# Patient Record
Sex: MALE | Race: BLACK OR AFRICAN AMERICAN | NOT HISPANIC OR LATINO | Employment: FULL TIME | URBAN - METROPOLITAN AREA
[De-identification: names, ages, dates, MRNs, and addresses within clinical notes are randomized per-mention and may not be internally consistent; named-entity substitution may affect disease eponyms.]

---

## 2018-12-27 ENCOUNTER — HOSPITAL ENCOUNTER (EMERGENCY)
Facility: OTHER | Age: 43
Discharge: HOME | End: 2018-12-27
Attending: EMERGENCY MEDICINE
Payer: MEDICAID

## 2018-12-27 VITALS
HEIGHT: 68 IN | DIASTOLIC BLOOD PRESSURE: 68 MMHG | SYSTOLIC BLOOD PRESSURE: 115 MMHG | RESPIRATION RATE: 18 BRPM | TEMPERATURE: 99 F | WEIGHT: 155 LBS | HEART RATE: 71 BPM | OXYGEN SATURATION: 97 % | BODY MASS INDEX: 23.49 KG/M2

## 2018-12-27 DIAGNOSIS — R05.9 COUGH: ICD-10-CM

## 2018-12-27 DIAGNOSIS — R68.89 FLU-LIKE SYMPTOMS: Primary | ICD-10-CM

## 2018-12-27 LAB
INFLUENZA A, MOLECULAR: NEGATIVE
INFLUENZA B, MOLECULAR: NEGATIVE
SPECIMEN SOURCE: NORMAL

## 2018-12-27 PROCEDURE — 99284 EMERGENCY DEPT VISIT MOD MDM: CPT | Mod: 25

## 2018-12-27 PROCEDURE — 96372 THER/PROPH/DIAG INJ SC/IM: CPT

## 2018-12-27 PROCEDURE — 63600175 PHARM REV CODE 636 W HCPCS: Performed by: PHYSICIAN ASSISTANT

## 2018-12-27 PROCEDURE — 87502 INFLUENZA DNA AMP PROBE: CPT

## 2018-12-27 RX ORDER — GUAIFENESIN/DEXTROMETHORPHAN 100-10MG/5
5 SYRUP ORAL 4 TIMES DAILY PRN
Qty: 120 ML | Refills: 0 | Status: SHIPPED | OUTPATIENT
Start: 2018-12-27 | End: 2019-01-06

## 2018-12-27 RX ORDER — FLUTICASONE PROPIONATE 50 MCG
1 SPRAY, SUSPENSION (ML) NASAL 2 TIMES DAILY
Qty: 15 G | Refills: 0 | Status: SHIPPED | OUTPATIENT
Start: 2018-12-27

## 2018-12-27 RX ORDER — DEXAMETHASONE SODIUM PHOSPHATE 4 MG/ML
8 INJECTION, SOLUTION INTRA-ARTICULAR; INTRALESIONAL; INTRAMUSCULAR; INTRAVENOUS; SOFT TISSUE
Status: COMPLETED | OUTPATIENT
Start: 2018-12-27 | End: 2018-12-27

## 2018-12-27 RX ADMIN — DEXAMETHASONE SODIUM PHOSPHATE 8 MG: 4 INJECTION, SOLUTION INTRAMUSCULAR; INTRAVENOUS at 05:12

## 2018-12-27 NOTE — ED PROVIDER NOTES
Encounter Date: 12/27/2018       History     Chief Complaint   Patient presents with    flu-like symptoms     x 3 days with body aches, productive cough, and headache unrelieved from OTC meds     Patient is a 43-year-old male with no pertinent past medical history presents to the ED with flu-like symptoms. Patient reports 3 day history of general malaise, body aches, congestion, sore throat nonproductive cough and subjective fever.  He reports taking over-the-counter medicines without relief.  He states he took Tylenol a few hours ago.  Patient states I feel like it is all in my chest .  He denies chest pain or shortness of breath. He also reports decreased appetite.  He denies nausea, emesis, diarrhea.      The history is provided by the patient.     Review of patient's allergies indicates:  No Known Allergies  No past medical history on file.  No past surgical history on file.  No family history on file.  Social History     Tobacco Use    Smoking status: Current Every Day Smoker     Packs/day: 1.00     Years: 15.00     Pack years: 15.00     Types: Cigarettes   Substance Use Topics    Alcohol use: Yes     Alcohol/week: 1.2 oz     Types: 2 Cans of beer per week    Drug use: Yes     Types: Marijuana     Review of Systems   Constitutional: Positive for appetite change and fatigue. Negative for chills and fever.   HENT: Positive for congestion, sinus pain and sore throat.    Eyes: Negative for pain.   Respiratory: Positive for cough. Negative for shortness of breath.    Cardiovascular: Negative for chest pain.   Gastrointestinal: Negative for abdominal pain, diarrhea, nausea and vomiting.   Genitourinary: Negative for dysuria.   Musculoskeletal: Positive for myalgias (Generalized body aches). Negative for arthralgias.   Skin: Negative for rash.   Neurological: Negative for numbness.       Physical Exam     Initial Vitals [12/27/18 1534]   BP Pulse Resp Temp SpO2   118/60 82 16 98.4 °F (36.9 °C) 96 %      MAP        --         Physical Exam    Constitutional: Vital signs are normal. He is cooperative.   HENT:   Head: Normocephalic and atraumatic.   Eyes: EOM are normal. Pupils are equal, round, and reactive to light.   Neck: Normal range of motion. Neck supple.   Cardiovascular: Normal rate, regular rhythm and intact distal pulses.   Abdominal: Soft. Bowel sounds are normal. There is no tenderness.   Neurological: He is alert. No cranial nerve deficit. GCS eye subscore is 4. GCS verbal subscore is 5. GCS motor subscore is 6.   Skin: Skin is warm and dry. No rash noted.         ED Course   Procedures  Labs Reviewed   INFLUENZA A & B BY MOLECULAR          Imaging Results          X-Ray Chest PA And Lateral (Final result)  Result time 12/27/18 17:32:45    Final result by Fady Hugo MD (12/27/18 17:32:45)                 Impression:      No acute cardiopulmonary disease      Electronically signed by: Fady Hugo MD  Date:    12/27/2018  Time:    17:32             Narrative:    EXAMINATION:  XR CHEST PA AND LATERAL    CLINICAL HISTORY:  Cough    TECHNIQUE:  PA and lateral views of the chest were performed.    COMPARISON:  04/22/2016    FINDINGS:  Heart size and mediastinal contour are normal.  Lungs are expanded and clear.  No lung consolidation or pleural fluid is identified.  The skeletal structures are intact.                                 Medical Decision Making:   Initial Assessment:   Urgent evaluation of a 43 y.o. male presenting to the emergency department complaining of flu like symptoms. Patient is afebrile, nontoxic appearing and hemodynamically stable.  Patient likely has a viral infection.  I do not believe the  patient to have pneumonia, serious bacterial infection, sepsis, severe dehydration, or hypoxia to warrant blood work at this time.  Will obtain influenza swab and chest x-ray.  ED Management:  Rapid flu swab is negative. Chest x-ray reveals no acute cardiopulmonary process.  I do not believe  further workup is indicated this time.  Patient given Decadron injection here in the ED.  Will send home with cough medicine and Flonase.  He is advised follow up with primary care return here for new worsening symptoms.  He has no other complaints this time is stable for discharge.                      Clinical Impression:     1. Flu-like symptoms    2. Cough                               Michelet Modi PA-C  12/27/18 1817

## 2018-12-27 NOTE — ED TRIAGE NOTES
"Pt arrived to ED with c/o generalized body aches, cough, congestion and sore throat x3 days unrelieved by OTC medication. Pt states " I woke up this morning and I am just feeling worse, I feel like it is all in my chest" pt denies fever, chills, n/v, abdominal pain, dysuria.   "

## 2022-03-04 ENCOUNTER — HOSPITAL ENCOUNTER (EMERGENCY)
Facility: OTHER | Age: 47
Discharge: HOME | End: 2022-03-04
Attending: EMERGENCY MEDICINE
Payer: MEDICAID

## 2022-03-04 VITALS
DIASTOLIC BLOOD PRESSURE: 79 MMHG | BODY MASS INDEX: 30.31 KG/M2 | OXYGEN SATURATION: 100 % | HEART RATE: 64 BPM | WEIGHT: 200 LBS | HEIGHT: 68 IN | TEMPERATURE: 98 F | SYSTOLIC BLOOD PRESSURE: 128 MMHG | RESPIRATION RATE: 18 BRPM
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
## 2022-03-04 DIAGNOSIS — M79.89 LEG SWELLING: ICD-10-CM

## 2022-03-04 DIAGNOSIS — S93.602A SPRAIN OF LEFT FOOT, INITIAL ENCOUNTER: Primary | ICD-10-CM

## 2022-03-04 PROCEDURE — 99284 EMERGENCY DEPT VISIT MOD MDM: CPT | Mod: 25

## 2022-03-04 RX ORDER — NAPROXEN 500 MG/1
500 TABLET ORAL EVERY 12 HOURS PRN
Qty: 10 TABLET | Refills: 0 | Status: SHIPPED | OUTPATIENT
Start: 2022-03-04

## 2022-03-04 RX ORDER — KETOROLAC TROMETHAMINE 30 MG/ML
30 INJECTION, SOLUTION INTRAMUSCULAR; INTRAVENOUS
Status: DISCONTINUED | OUTPATIENT
Start: 2022-03-04 | End: 2022-03-04 | Stop reason: HOSPADM

## 2022-03-04 NOTE — ED PROVIDER NOTES
"Encounter Date: 3/4/2022       History     Chief Complaint   Patient presents with    Foot Injury     Pt c.o left foot pain onset Tuesday. Pt not sure what happen to foot.  Pt states "I must have slipped off the curb or something"  pt has mild swelling to top of left foot. No deformity noted.  + left pedal pulse noted.  AAO x 3nadn     Chief complaint:  Left foot pain    46-year-old male presents with left foot pain since Tuesday.  Patient reports that he stepped onto a curb awkwardly to avoid a crowd.  Patient reports pain is been present since then.  Patient reports the pain has caused him to walk awkwardly.  Reports swelling.  No erythema.  Skin remains intact without rash or signs of infection.  No fever.  No calf pain.  No pain to the rest of the extremity.  No pain medication taken prior to arrival.  Current pain is rated 6/10.    This is the extent of patient's complaints for this ER encounter.     The history is provided by the patient.     Review of patient's allergies indicates:  No Known Allergies  No past medical history on file.  No past surgical history on file.  No family history on file.  Social History     Tobacco Use    Smoking status: Current Every Day Smoker     Packs/day: 1.00     Years: 15.00     Pack years: 15.00     Types: Cigarettes   Substance Use Topics    Alcohol use: Yes     Alcohol/week: 2.0 standard drinks     Types: 2 Cans of beer per week    Drug use: Yes     Types: Marijuana     Review of Systems   Constitutional: Negative for fever.   HENT: Negative for congestion, rhinorrhea and sore throat.    Respiratory: Negative for cough and shortness of breath.    Cardiovascular: Negative for chest pain.   Gastrointestinal: Negative for abdominal pain.   Genitourinary: Negative for difficulty urinating.   Musculoskeletal: Positive for arthralgias (Foot pain with swelling). Negative for back pain, myalgias and neck pain.   Skin: Negative for rash and wound.   Neurological: Negative for " weakness and headaches.   Psychiatric/Behavioral: Negative.        Physical Exam     Initial Vitals [03/04/22 1540]   BP Pulse Resp Temp SpO2   122/76 75 18 98.1 °F (36.7 °C) 97 %      MAP       --         Physical Exam    Nursing note and vitals reviewed.  Constitutional: No distress.   HENT:   Head: Normocephalic and atraumatic.   Nose: Nose normal.   Mouth/Throat: Oropharynx is clear and moist and mucous membranes are normal.   Eyes: Conjunctivae, EOM and lids are normal. Right pupil is round. Left pupil is round. Pupils are equal.   Neck: Neck supple.   Cardiovascular: Normal rate, regular rhythm and normal heart sounds.   Pulmonary/Chest: Effort normal and breath sounds normal. No respiratory distress.   Musculoskeletal:         General: Normal range of motion.      Cervical back: Neck supple.      Left foot: Normal range of motion and normal capillary refill. Swelling and tenderness present. No deformity or laceration. Normal pulse.      Comments: Left foot with swelling and tenderness.  No deformity.  Skin remains intact without signs of infection or rash.  No erythema.  No pain or tenderness to the ankle, tib-fib, knee, or femur.  Generalized swelling is noted to the left lower leg.  1+ edema.     Neurological: He is alert and oriented to person, place, and time. He has normal strength.   Skin: Skin is warm and dry. No rash noted.   Psychiatric: He has a normal mood and affect. His behavior is normal.         ED Course   Procedures  Labs Reviewed - No data to display       Imaging Results          US Lower Extremity Veins Left (Final result)  Result time 03/04/22 18:30:53    Final result by Kadeem Wang MD (03/04/22 18:30:53)                 Impression:      No evidence of deep venous thrombosis in the left lower extremity.      Electronically signed by: Kadeem Wang MD  Date:    03/04/2022  Time:    18:30             Narrative:    EXAMINATION:  US LOWER EXTREMITY VEINS LEFT    CLINICAL HISTORY:  Other  specified soft tissue disorders    TECHNIQUE:  Duplex and color flow Doppler evaluation and graded compression of the left lower extremity veins was performed.    COMPARISON:  None    FINDINGS:  Left thigh veins: The common femoral, femoral, popliteal, upper greater saphenous, and deep femoral veins are patent and free of thrombus. The veins are normally compressible and have normal phasic flow and augmentation response.    Left calf veins: The visualized calf veins are patent.    Contralateral CFV: The contralateral (right) common femoral vein is patent and free of thrombus.    Miscellaneous: None                               X-Ray Foot Complete Left (Final result)  Result time 03/04/22 16:35:37    Final result by Jose Tineo MD (03/04/22 16:35:37)                 Impression:      No acute fracture identified.      Electronically signed by: oJse Tineo MD  Date:    03/04/2022  Time:    16:35             Narrative:    EXAMINATION:  XR FOOT COMPLETE 3 VIEW LEFT    CLINICAL HISTORY:  .  Fall (on)(from) sidewalk curb, initial encounter    TECHNIQUE:  AP, lateral and oblique views of the left foot were performed.    COMPARISON:  None    FINDINGS:  No evidence of an acute fracture or dislocation.  Joint spaces of the foot appear relatively well maintained.  No radiopaque foreign bodies.                                 Medications   ketorolac injection 30 mg (has no administration in time range)     Medical Decision Making:   Initial Assessment:   46-year-old male presents for evaluation of left foot pain.  His left leg and left foot is noted to be swollen.  X-rays are without acute finding.  Due to swelling, ultrasound is ordered.  Tenderness is only present to the left lateral foot.  No erythema or signs of infection.  Skin remains intact.  No fever.  Differential Diagnosis:   Fracture, strain, sprain, dislocation, contusion, DVT  Clinical Tests:   Radiological Study: Ordered  ED Management:  X-ray without  acute significant findings.  Do swelling noted in the leg, DVT ultrasound ordered.  Swelling could be due to abnormal ambulation secondary to pain.  Toradol IM administered.    Refer to patient course below for additional management.              ED Course as of 03/04/22 1844   Fri Mar 04, 2022   1838 No DVT of the left lower extremity noted on ultrasound. [EW]   1843 Patient refusing crutches.  Ace wrap applied.  Educated on rice.  Home with naproxen.    Patient/caregiver voices understanding and feels comfortable with discharge plan.    The patient/caregiver acknowledges that close follow up with medical provider is required. Instructed to follow up with PCP within 2 days. Patient/caregiver was given specific return precautions. The patient/caregiver agrees to comply with all instruction and directions given in the ER.  [EW]      ED Course User Index  [EW] Flor Gonsales NP             Clinical Impression:   Final diagnoses:  [W10.1XXA] Fall (on)(from) sidewalk curb, initial encounter  [M79.89] Leg swelling  [S93.602A] Sprain of left foot, initial encounter (Primary)          ED Disposition Condition    Discharge Stable        ED Prescriptions     Medication Sig Dispense Start Date End Date Auth. Provider    naproxen (NAPROSYN) 500 MG tablet Take 1 tablet (500 mg total) by mouth every 12 (twelve) hours as needed (pain). Take with food. 10 tablet 3/4/2022  Flor Gonsales NP        Follow-up Information     Follow up With Specialties Details Why Contact Info    Primary care  Schedule an appointment as soon as possible for a visit in 2 days             Flor Gonsales NP  03/04/22 1844

## 2022-03-04 NOTE — ED TRIAGE NOTES
"The patient pointed at his foot, and stated "I have pain in the lower left side of my left foot." The left foot appears swollen, but pulses are palpable. The patient stated he thinks he twisted his foot while almost falling off a curb on Tuesday. The pain has gotten progressively worse since then. The patient stated, "I felt like the toes cracked like when you crack your knuckles and they make that sound. My toes are doing that.  "

## 2022-03-05 NOTE — DISCHARGE INSTRUCTIONS
**Follow up with PCP in 24-48 hours. Return to ER with worsening of symptoms.     **You may also take over-the-counter Tylenol as directed on package insert as needed for pain.     Ice to affected area(s) 4x per day for 20 mins. Keep ace wrap on for compression. Elevate extremity as often as possible.

## 2022-05-18 ENCOUNTER — HOSPITAL ENCOUNTER (EMERGENCY)
Facility: HOSPITAL | Age: 47
Discharge: HOME | End: 2022-05-18
Attending: EMERGENCY MEDICINE
Payer: MEDICAID

## 2022-05-18 VITALS
HEART RATE: 60 BPM | RESPIRATION RATE: 16 BRPM | SYSTOLIC BLOOD PRESSURE: 135 MMHG | TEMPERATURE: 98 F | OXYGEN SATURATION: 97 % | DIASTOLIC BLOOD PRESSURE: 80 MMHG | WEIGHT: 210 LBS | BODY MASS INDEX: 31.93 KG/M2

## 2022-05-18 DIAGNOSIS — S92.345A CLOSED NONDISPLACED FRACTURE OF FOURTH METATARSAL BONE OF LEFT FOOT, INITIAL ENCOUNTER: ICD-10-CM

## 2022-05-18 DIAGNOSIS — L74.0 HEAT RASH: Primary | ICD-10-CM

## 2022-05-18 DIAGNOSIS — M79.672 ACUTE FOOT PAIN, LEFT: ICD-10-CM

## 2022-05-18 DIAGNOSIS — M79.89 LEFT LEG SWELLING: ICD-10-CM

## 2022-05-18 LAB
ALBUMIN SERPL BCP-MCNC: 3.9 G/DL (ref 3.5–5.2)
ALP SERPL-CCNC: 124 U/L (ref 55–135)
ALT SERPL W/O P-5'-P-CCNC: 73 U/L (ref 10–44)
ANION GAP SERPL CALC-SCNC: 7 MMOL/L (ref 8–16)
AST SERPL-CCNC: 43 U/L (ref 10–40)
BASOPHILS # BLD AUTO: 0.02 K/UL (ref 0–0.2)
BASOPHILS NFR BLD: 0.5 % (ref 0–1.9)
BILIRUB SERPL-MCNC: 0.5 MG/DL (ref 0.1–1)
BNP SERPL-MCNC: <10 PG/ML (ref 0–99)
BUN SERPL-MCNC: 18 MG/DL (ref 6–20)
CALCIUM SERPL-MCNC: 9.6 MG/DL (ref 8.7–10.5)
CHLORIDE SERPL-SCNC: 103 MMOL/L (ref 95–110)
CO2 SERPL-SCNC: 28 MMOL/L (ref 23–29)
CREAT SERPL-MCNC: 0.9 MG/DL (ref 0.5–1.4)
CRP SERPL-MCNC: 5.9 MG/L (ref 0–8.2)
DIFFERENTIAL METHOD: ABNORMAL
EOSINOPHIL # BLD AUTO: 0.1 K/UL (ref 0–0.5)
EOSINOPHIL NFR BLD: 2 % (ref 0–8)
ERYTHROCYTE [DISTWIDTH] IN BLOOD BY AUTOMATED COUNT: 11.1 % (ref 11.5–14.5)
ERYTHROCYTE [SEDIMENTATION RATE] IN BLOOD BY WESTERGREN METHOD: 13 MM/HR (ref 0–23)
EST. GFR  (AFRICAN AMERICAN): >60 ML/MIN/1.73 M^2
EST. GFR  (NON AFRICAN AMERICAN): >60 ML/MIN/1.73 M^2
GLUCOSE SERPL-MCNC: 112 MG/DL (ref 70–110)
HCT VFR BLD AUTO: 46.5 % (ref 40–54)
HGB BLD-MCNC: 16 G/DL (ref 14–18)
IMM GRANULOCYTES # BLD AUTO: 0.01 K/UL (ref 0–0.04)
IMM GRANULOCYTES NFR BLD AUTO: 0.2 % (ref 0–0.5)
LYMPHOCYTES # BLD AUTO: 1.6 K/UL (ref 1–4.8)
LYMPHOCYTES NFR BLD: 39 % (ref 18–48)
MCH RBC QN AUTO: 31.4 PG (ref 27–31)
MCHC RBC AUTO-ENTMCNC: 34.4 G/DL (ref 32–36)
MCV RBC AUTO: 91 FL (ref 82–98)
MONOCYTES # BLD AUTO: 0.4 K/UL (ref 0.3–1)
MONOCYTES NFR BLD: 9.9 % (ref 4–15)
NEUTROPHILS # BLD AUTO: 2 K/UL (ref 1.8–7.7)
NEUTROPHILS NFR BLD: 48.4 % (ref 38–73)
NRBC BLD-RTO: 0 /100 WBC
PLATELET # BLD AUTO: 169 K/UL (ref 150–450)
PMV BLD AUTO: 10.5 FL (ref 9.2–12.9)
POTASSIUM SERPL-SCNC: 4.5 MMOL/L (ref 3.5–5.1)
PROT SERPL-MCNC: 7.6 G/DL (ref 6–8.4)
RBC # BLD AUTO: 5.09 M/UL (ref 4.6–6.2)
SODIUM SERPL-SCNC: 138 MMOL/L (ref 136–145)
WBC # BLD AUTO: 4.05 K/UL (ref 3.9–12.7)

## 2022-05-18 PROCEDURE — 85652 RBC SED RATE AUTOMATED: CPT | Performed by: EMERGENCY MEDICINE

## 2022-05-18 PROCEDURE — 99285 EMERGENCY DEPT VISIT HI MDM: CPT | Mod: 25

## 2022-05-18 PROCEDURE — 86140 C-REACTIVE PROTEIN: CPT | Performed by: EMERGENCY MEDICINE

## 2022-05-18 PROCEDURE — 85025 COMPLETE CBC W/AUTO DIFF WBC: CPT | Performed by: EMERGENCY MEDICINE

## 2022-05-18 PROCEDURE — 99284 EMERGENCY DEPT VISIT MOD MDM: CPT | Mod: ,,, | Performed by: EMERGENCY MEDICINE

## 2022-05-18 PROCEDURE — 80053 COMPREHEN METABOLIC PANEL: CPT | Performed by: EMERGENCY MEDICINE

## 2022-05-18 PROCEDURE — 99284 PR EMERGENCY DEPT VISIT,LEVEL IV: ICD-10-PCS | Mod: ,,, | Performed by: EMERGENCY MEDICINE

## 2022-05-18 PROCEDURE — 83880 ASSAY OF NATRIURETIC PEPTIDE: CPT | Performed by: EMERGENCY MEDICINE

## 2022-05-18 PROCEDURE — 36000 PLACE NEEDLE IN VEIN: CPT

## 2022-05-18 RX ORDER — HYDROCHLOROTHIAZIDE 25 MG/1
25 TABLET ORAL DAILY
Qty: 7 TABLET | Refills: 0 | Status: SHIPPED | OUTPATIENT
Start: 2022-05-18 | End: 2022-05-25

## 2022-05-18 NOTE — ED PROVIDER NOTES
Source of History   Patient    Chief Complaint   Leg Swelling (Left leg swelling, injury to left 4th toe x3 days ago, has red patchy rash to left leg, warm to touch, complains of itching to leg)      History Of Present Illness   Marcos Cohen is a 46 y.o. male presenting with left foot pain that began a few days ago.  He states he injured the toe a ways back and started to hurt more in the last 3 days.  Also notes swelling in both legs, left greater than right as well as a rash on both legs, left greater than right.  The rash is slightly itchy.  No fever or chills.    Review Of Systems   As per HPI and below:  General: No fever.  No chills.  Eyes: No visual changes.  Head: No headache.    Integument: Notes rash.  Chest: No shortness of breath.  Cardiovascular: No chest pain.  Abdomen: No abdominal pain.  No nausea or vomiting.  Urinary: No abnormal urination.  Neurologic: No focal weakness.  No numbness.  Hematologic: No easy bruising.  Endocrine: No excessive thirst or urination.      Review of patient's allergies indicates:  No Known Allergies    No current facility-administered medications on file prior to encounter.     Current Outpatient Medications on File Prior to Encounter   Medication Sig Dispense Refill    fluticasone (FLONASE) 50 mcg/actuation nasal spray 1 spray (50 mcg total) by Each Nare route 2 (two) times daily. 15 g 0    naproxen (NAPROSYN) 500 MG tablet Take 1 tablet (500 mg total) by mouth every 12 (twelve) hours as needed (pain). Take with food. 10 tablet 0       Past History   As per HPI and below:  No past medical history on file.  No past surgical history on file.    Social History     Socioeconomic History    Marital status:    Tobacco Use    Smoking status: Current Every Day Smoker     Packs/day: 1.00     Years: 15.00     Pack years: 15.00     Types: Cigarettes   Substance and Sexual Activity    Alcohol use: Yes     Alcohol/week: 2.0 standard drinks     Types: 2 Cans  of beer per week    Drug use: Yes     Types: Marijuana    Sexual activity: Yes     Partners: Male       No family history on file.    Physical Exam     Vitals:    05/18/22 1024 05/18/22 1411 05/18/22 1617   BP: (!) 149/94 119/68 135/80   Pulse: 91 60 60   Resp: 18 18 16   Temp: 99.2 °F (37.3 °C) 97.6 °F (36.4 °C) 97.8 °F (36.6 °C)   TempSrc: Oral     SpO2: 96% 100% 97%   Weight: 95.3 kg (210 lb)       Appearance: No acute distress.  Skin: Patchy blanching erythematous rash on both legs, L>R, see below.  Does not go above knee or involve trunk/arms/neck/head.  Chest: Clear to auscultation bilaterally.  Good air movement.  No wheezes.  No rhonchi.  Cardiovascular: Regular rate and rhythm.  No murmurs. No gallops. No rubs.  Abdomen: Soft.  Not distended.  Nontender.  No guarding.  No rebound. 2+ non-pitting leg edema, L>R.  Musculoskeletal: Good range of motion all joints.  No deformities.  Neck supple.  No meningismus.  Left midfoot tenderness, no ecchymosis present.  Neurologic: Motor intact.  Sensation intact.  Cranial nerves intact.  Mental Status:  Alert and oriented x 3.  Appropriate, conversant.                Initial MDM   Leg swelling with rash.  Patchy, does not look like cellulitis.  It seems to be concentrated around both ankles, left greater than right, suspect heat rash.  Given the swelling, will check labs to rule out vasculitis.  Also check ultrasound.  Will x-ray foot where it hurts.  Anticipate discharge.    I decided to obtain the patient's medical records.    Medications - No data to display    Results and Course     Labs Reviewed   CBC W/ AUTO DIFFERENTIAL - Abnormal; Notable for the following components:       Result Value    MCH 31.4 (*)     RDW 11.1 (*)     All other components within normal limits   COMPREHENSIVE METABOLIC PANEL - Abnormal; Notable for the following components:    Glucose 112 (*)     AST 43 (*)     ALT 73 (*)     Anion Gap 7 (*)     All other components within normal limits    SEDIMENTATION RATE   C-REACTIVE PROTEIN   B-TYPE NATRIURETIC PEPTIDE       Imaging Results          US Lower Extremity Veins Bilateral (Final result)  Result time 05/18/22 15:46:05    Final result by Jamaal Navarrete IV, MD (05/18/22 15:46:05)                 Impression:      No DVT in either lower extremity.    Electronically signed by resident: Elgin Avila  Date:    05/18/2022  Time:    15:09    Electronically signed by: Jamaal Navarrete  Date:    05/18/2022  Time:    15:46             Narrative:    EXAMINATION:  US LOWER EXTREMITY VEINS BILATERAL    CLINICAL HISTORY:  Other specified soft tissue disorders    TECHNIQUE:  Duplex and color flow Doppler and dynamic compression was performed of the bilateral lower extremity veins was performed.    COMPARISON:  Left lower extremity venous ultrasound from 03/04/2022.    FINDINGS:  Right thigh veins: The common femoral, femoral, popliteal, upper greater saphenous, and deep femoral veins are patent and free of thrombus. The veins are normally compressible and have normal phasic flow and augmentation response.    Right calf veins: The visualized calf veins are patent.    Left thigh veins: The common femoral, femoral, popliteal, upper greater saphenous, and deep femoral veins are patent and free of thrombus. The veins are normally compressible and have normal phasic flow and augmentation response.    Left calf veins: The visualized calf veins are patent.    Miscellaneous: None                               X-Ray Foot Complete Left (Final result)  Result time 05/18/22 13:08:18    Final result by Efrain Cruz MD (05/18/22 13:08:18)                 Impression:      As above.      Electronically signed by: Efrain Cruz  Date:    05/18/2022  Time:    13:08             Narrative:    EXAMINATION:  XR FOOT COMPLETE 3 VIEW LEFT    CLINICAL HISTORY:  Pain in left foot    TECHNIQUE:  AP, lateral and oblique views of the left foot were performed.    COMPARISON:  March 4,  2022    FINDINGS:  Subacute healing nondisplaced fracture of the distal 4th metatarsal diametaphyseal region.  If this fracture was present on the earlier exam, it was occult at that time.  No other findings to suggest acute or healing fractures.  Preserved joint spaces.  Soft tissue swelling dorsally at the level of the midfoot, metatarsals, and MTP regions.                                ED Course as of 05/18/22 1706   Wed May 18, 2022   1248 WBC: 4.05 [DC]   1248 Hemoglobin: 16.0 [DC]   1248 Platelets: 169 [DC]   1248 Sed Rate: 13 [DC]   1307 CRP: 5.9 [DC]   1307 AST(!): 43 [DC]   1307 ALT(!): 73 [DC]   1307 Creatinine: 0.9 [DC]   1354 BNP: <10 [DC]   1355 X-Ray Foot Complete Left  Healing 4th toe fracture per my independent interpretation.     [DC]      ED Course User Index  [DC] Shreyas Tello MD           MDM, Impression and Plan   46 y.o. male with bilateral leg swelling, left greater than right, heat rash.  His known broken foot is also hurting a little more.  X-ray looks like fracture is healing well, will give cast shoe and follow-up with podiatry.  No evidence of vasculitis on labs.  Rash follows a pattern that suggests heat exposure with ankle sock sweat being the worst of it.  I doubt infection.  Will treat conservatively with hydrocortisone, recommend drying powder to patient as well.  Leg edema does not appear to be CHF or DVT.  Will treat with hydrocortisone, follow-up PCP.       Final diagnoses:  [M79.672] Acute foot pain, left  [M79.89] Left leg swelling  [L74.0] Heat rash (Primary)  [S92.345A] Closed nondisplaced fracture of fourth metatarsal bone of left foot, initial encounter          ED Disposition Condition    Discharge Stable        ED Prescriptions     Medication Sig Dispense Start Date End Date Auth. Provider    hydroCHLOROthiazide (HYDRODIURIL) 25 MG tablet Take 1 tablet (25 mg total) by mouth once daily. for 7 days 7 tablet 5/18/2022 5/25/2022 Shreyas Tello MD        Follow-up  Information     Follow up With Specialties Details Why Contact Info    Your primary care doctor  In 1 week             Shreyas Tello MD  05/18/22 8357

## 2022-05-18 NOTE — ED NOTES
"Pt reports, "my left leg feels swollen and kelly what this is". Redness and swelling noted on bilateral lower legs and L foot.     Two patient identifiers have been checked and are correct.      Appearance: Pt awake, alert & oriented to person, place & time. Pt in no acute distress at present time. Pt is clean and well groomed with clothes appropriately fastened.   Skin: Bilateral lower legs red and swollen, warm to touch. .   Musculoskeletal: Patient moving all extremities well, no obvious swelling or deformities noted.   Respiratory: Respirations spontaneous, even, and non-labored. Visible chest rise noted. Airway is open and patent. No accessory muscle use noted.   Neurologic: Sensation is intact. Speech is clear and appropriate. Eyes open spontaneously, behavior appropriate to situation, follows commands, facial expression symmetrical, bilateral hand grasp equal and even, purposeful motor response noted.  Cardiac: All peripheral pulses present. No Bilateral lower extremity edema. Cap refill is <3 seconds.  Abdomen: Abdomen soft, non-tender to palpation.   : Pt reports no dysuria or hematuria.   "

## 2023-10-11 ENCOUNTER — HOSPITAL ENCOUNTER (EMERGENCY)
Facility: OTHER | Age: 48
Discharge: HOME | End: 2023-10-11
Attending: EMERGENCY MEDICINE
Payer: COMMERCIAL

## 2023-10-11 VITALS
WEIGHT: 190 LBS | OXYGEN SATURATION: 96 % | TEMPERATURE: 99 F | HEIGHT: 68 IN | RESPIRATION RATE: 12 BRPM | DIASTOLIC BLOOD PRESSURE: 85 MMHG | BODY MASS INDEX: 28.79 KG/M2 | HEART RATE: 61 BPM | SYSTOLIC BLOOD PRESSURE: 140 MMHG

## 2023-10-11 DIAGNOSIS — J06.9 VIRAL URI WITH COUGH: Primary | ICD-10-CM

## 2023-10-11 LAB
CTP QC/QA: YES
CTP QC/QA: YES
POC MOLECULAR INFLUENZA A AGN: NEGATIVE
POC MOLECULAR INFLUENZA B AGN: NEGATIVE
SARS-COV-2 RDRP RESP QL NAA+PROBE: NEGATIVE

## 2023-10-11 PROCEDURE — 87635 SARS-COV-2 COVID-19 AMP PRB: CPT | Performed by: PHYSICIAN ASSISTANT

## 2023-10-11 PROCEDURE — 63600175 PHARM REV CODE 636 W HCPCS: Performed by: NURSE PRACTITIONER

## 2023-10-11 PROCEDURE — 25000003 PHARM REV CODE 250: Performed by: NURSE PRACTITIONER

## 2023-10-11 PROCEDURE — 99284 EMERGENCY DEPT VISIT MOD MDM: CPT

## 2023-10-11 PROCEDURE — 96372 THER/PROPH/DIAG INJ SC/IM: CPT | Performed by: NURSE PRACTITIONER

## 2023-10-11 RX ORDER — DEXAMETHASONE SODIUM PHOSPHATE 4 MG/ML
8 INJECTION, SOLUTION INTRA-ARTICULAR; INTRALESIONAL; INTRAMUSCULAR; INTRAVENOUS; SOFT TISSUE
Status: COMPLETED | OUTPATIENT
Start: 2023-10-11 | End: 2023-10-11

## 2023-10-11 RX ORDER — ACETAMINOPHEN 500 MG
1000 TABLET ORAL
Status: COMPLETED | OUTPATIENT
Start: 2023-10-11 | End: 2023-10-11

## 2023-10-11 RX ORDER — GUAIFENESIN 600 MG/1
600 TABLET, EXTENDED RELEASE ORAL 2 TIMES DAILY
Qty: 20 TABLET | Refills: 0 | Status: SHIPPED | OUTPATIENT
Start: 2023-10-11 | End: 2023-10-21

## 2023-10-11 RX ORDER — BENZONATATE 100 MG/1
100 CAPSULE ORAL 3 TIMES DAILY PRN
Status: DISCONTINUED | OUTPATIENT
Start: 2023-10-11 | End: 2023-10-11 | Stop reason: HOSPADM

## 2023-10-11 RX ORDER — BENZONATATE 100 MG/1
100 CAPSULE ORAL 3 TIMES DAILY PRN
Qty: 20 CAPSULE | Refills: 0 | Status: SHIPPED | OUTPATIENT
Start: 2023-10-11 | End: 2023-10-21

## 2023-10-11 RX ADMIN — DEXAMETHASONE SODIUM PHOSPHATE 8 MG: 4 INJECTION, SOLUTION INTRA-ARTICULAR; INTRALESIONAL; INTRAMUSCULAR; INTRAVENOUS; SOFT TISSUE at 03:10

## 2023-10-11 RX ADMIN — ACETAMINOPHEN 1000 MG: 500 TABLET ORAL at 03:10

## 2023-10-11 RX ADMIN — BENZONATATE 100 MG: 100 CAPSULE ORAL at 03:10

## 2023-10-11 NOTE — FIRST PROVIDER EVALUATION
Emergency Department TeleTriage Encounter Note      CHIEF COMPLAINT    Chief Complaint   Patient presents with    General Illness     Reports body aches, chills, onset today.       VITAL SIGNS   Initial Vitals [10/11/23 1403]   BP Pulse Resp Temp SpO2   (!) 168/87 64 18 98.1 °F (36.7 °C) 99 %      MAP       --            ALLERGIES    Review of patient's allergies indicates:  No Known Allergies    PROVIDER TRIAGE NOTE  Patient presents with complaint of cough, congestion and body aches that started today.  Denies nausea, vomiting or diarrhea.      Phy:   Constitutional: well nourished, well developed, appearing stated age, NAD        Initial orders will be placed and care will be transferred to an alternate provider when patient is roomed for a full evaluation. Any additional orders and the final disposition will be determined by that provider.        ORDERS  Labs Reviewed - No data to display    ED Orders (720h ago, onward)      None              Virtual Visit Note: The provider triage portion of this emergency department evaluation and documentation was performed via [employer], a HIPAA-compliant telemedicine application, in concert with a tele-presenter in the room. A face to face patient evaluation with one of my colleagues will occur once the patient is placed in an emergency department room.      DISCLAIMER: This note was prepared with [employer] voice recognition transcription software. Garbled syntax, mangled pronouns, and other bizarre constructions may be attributed to that software system.

## 2023-10-11 NOTE — ED TRIAGE NOTES
Pt c/o on day chills and body aches. Also c/o 1 month BLE edema, worse on L leg than R with pain behind L knee. Trace pitting.

## 2023-10-11 NOTE — ED PROVIDER NOTES
"Source of History:  Patient    Chief complaint:  General Illness (Reports body aches, chills, onset today.)      HPI:  Marcos Cohen is a 47 y.o. male presenting with body aches with cough, congestion and a mild headache that began earlier today.  He is not tried any over-the-counter medications.  He denies any chest pain, shortness of breath.  Denies any fever.    This is the extent to the patients complaints today here in the emergency department.    PMH:  As per HPI and below:  No past medical history on file.  No past surgical history on file.    Social History     Tobacco Use    Smoking status: Every Day     Current packs/day: 1.00     Average packs/day: 1 pack/day for 15.0 years (15.0 ttl pk-yrs)     Types: Cigarettes   Substance Use Topics    Alcohol use: Yes     Alcohol/week: 2.0 standard drinks of alcohol     Types: 2 Cans of beer per week    Drug use: Yes     Types: Marijuana     Review of patient's allergies indicates:  No Known Allergies    ROS: As per HPI and below:  General:  Chills, body aches  Eyes: No visual changes.  ENT:  Cough, congestion  Head:  headache.    Chest:  No shortness of breath.  Cardiovascular: No chest pain.  Abdomen: No abdominal pain.  No nausea or vomiting.   Genito-Urinary: No abnormal urination.  Neurologic: No focal weakness.  No numbness.  MSK: no back pain.  Integument: No rashes or lesions.  Hematologic: No easy bruising.  Endocrine: No excessive thirst or urination.    Physical Exam:    BP (!) 140/85   Pulse 61   Temp 98.7 °F (37.1 °C)   Resp 12   Ht 5' 8" (1.727 m)   Wt 86.2 kg (190 lb)   SpO2 96%   BMI 28.89 kg/m²   Vitals:    10/11/23 1403 10/11/23 1514   BP: (!) 168/87 (!) 140/85   Pulse: 64 61   Resp: 18 12   Temp: 98.1 °F (36.7 °C) 98.7 °F (37.1 °C)   TempSrc: Oral    SpO2: 99% 96%   Weight: 86.2 kg (190 lb)    Height: 5' 8" (1.727 m)        Nursing note and vital signs reviewed.  Appearance: No acute distress.  Well-appearing, nontoxic  Eyes:  No " conjunctival injection.  Extraocular muscles are intact.  ENT: Oropharynx clear. No tonsillar exudate or swelling. Uvula midline and normal. No elevation of posterior oropharynx.  MM are pink and moist.  Clear nasal drainage Bilateral TM's are pearly grey.  Lymph: No cervical lymphadenopathy.   Chest/ Respiratory:  Clear to auscultation bilaterally.  Good air movement.  No wheezes.  No rhonchi. No rales. No accessory muscle use.  Cardiovascular:  Regular rate and rhythm.  No murmurs. No gallops. No rubs.  Musculoskeletal: Neck supple.  No meningismus.  Skin: No rashes seen.  Good turgor.  No abrasions.  No ecchymoses.  Neuro: alert and oriented x3,  no focal neurological deficits.  Psych: Appropriate, conversant    Labs that have been ordered have been independently reviewed and interpreted by myself.  Labs Reviewed   POCT INFLUENZA A/B MOLECULAR   SARS-COV-2 RDRP GENE       No orders to display         Initial Impression/ Differential Dx:  Differential Diagnosis includes, but is not limited to:  meningitis, nasal foreign body, otitis media/external, bacterial sinusitis, allergic rhinitis, influenza, bacterial/viral pharyngitis, bacterial/viral pneumonia, covid-19      MDM:    47 y.o. male with URI symptoms began this morning.  Negative COVID, flu in the ED. After complete evaluation, including thorough history and physical exam, the patient's symptoms are most likely due to viral upper respiratory infection. There are no concerning features on physical exam to suggest bacterial otitis media/externa, sinusitis, pharyngitis, or peritonsillar abscess. Vital signs do not suggest sepsis. Lung sounds are clear and not consistent with pneumonia. There is no neck pain or limited ROM to suggest retropharyngeal abscess or meningitis. The patient will be treated with supportive care.           Diagnostic Impression:    1. Viral URI with cough         ED Disposition Condition    Discharge Stable            ED Prescriptions        Medication Sig Dispense Start Date End Date Auth. Provider    benzonatate (TESSALON) 100 MG capsule Take 1 capsule (100 mg total) by mouth 3 (three) times daily as needed for Cough. 20 capsule 10/11/2023 10/21/2023 Laura Peter FNP    guaiFENesin (MUCINEX) 600 mg 12 hr tablet Take 1 tablet (600 mg total) by mouth 2 (two) times daily. for 10 days 20 tablet 10/11/2023 10/21/2023 Laura Peter FNP          Follow-up Information       Follow up With Specialties Details Why Contact Info    [facility] Emergency Dept Emergency Medicine Go to  If symptoms worsen [address] [id number] [phone]                 Laura Peter FNP  10/12/23 1048

## 2023-10-11 NOTE — Clinical Note
"Marcos "Marcos" Vicki was seen and treated in our emergency department on 10/11/2023.  He may return to work on 10/13/2023.       If you have any questions or concerns, please don't hesitate to call.      Fiona Gonsalez LPN    "

## 2024-02-18 ENCOUNTER — HOSPITAL ENCOUNTER (EMERGENCY)
Facility: OTHER | Age: 49
Discharge: HOME | End: 2024-02-18
Attending: EMERGENCY MEDICINE
Payer: COMMERCIAL

## 2024-02-18 VITALS
TEMPERATURE: 99 F | HEART RATE: 71 BPM | RESPIRATION RATE: 18 BRPM | SYSTOLIC BLOOD PRESSURE: 143 MMHG | WEIGHT: 200 LBS | BODY MASS INDEX: 30.41 KG/M2 | DIASTOLIC BLOOD PRESSURE: 92 MMHG | OXYGEN SATURATION: 97 %

## 2024-02-18 DIAGNOSIS — M79.606 LEG PAIN: ICD-10-CM

## 2024-02-18 DIAGNOSIS — M25.562 ACUTE PAIN OF LEFT KNEE: Primary | ICD-10-CM

## 2024-02-18 DIAGNOSIS — S89.92XA INJURY OF LEFT LOWER EXTREMITY, INITIAL ENCOUNTER: ICD-10-CM

## 2024-02-18 DIAGNOSIS — T14.90XA INJURY: ICD-10-CM

## 2024-02-18 PROCEDURE — 96372 THER/PROPH/DIAG INJ SC/IM: CPT

## 2024-02-18 PROCEDURE — 63600175 PHARM REV CODE 636 W HCPCS

## 2024-02-18 PROCEDURE — 29505 APPLICATION LONG LEG SPLINT: CPT | Mod: LT

## 2024-02-18 PROCEDURE — 99284 EMERGENCY DEPT VISIT MOD MDM: CPT | Mod: 25

## 2024-02-18 RX ORDER — KETOROLAC TROMETHAMINE 30 MG/ML
15 INJECTION, SOLUTION INTRAMUSCULAR; INTRAVENOUS
Status: COMPLETED | OUTPATIENT
Start: 2024-02-18 | End: 2024-02-18

## 2024-02-18 RX ORDER — IBUPROFEN 600 MG/1
600 TABLET ORAL EVERY 6 HOURS PRN
Qty: 20 TABLET | Refills: 0 | Status: SHIPPED | OUTPATIENT
Start: 2024-02-18

## 2024-02-18 RX ORDER — LIDOCAINE 50 MG/G
1 PATCH TOPICAL DAILY
Qty: 7 PATCH | Refills: 0 | Status: SHIPPED | OUTPATIENT
Start: 2024-02-18 | End: 2024-02-25

## 2024-02-18 RX ADMIN — KETOROLAC TROMETHAMINE 15 MG: 30 INJECTION INTRAMUSCULAR; INTRAVENOUS at 08:02

## 2024-02-18 NOTE — Clinical Note
"Marcos Back" Vicki was seen and treated in our emergency department on 2/18/2024.  He may return to work on 02/20/2024.       If you have any questions or concerns, please don't hesitate to call.       RN    "

## 2024-02-19 NOTE — ED TRIAGE NOTES
"Pt arrived with c/o LLE pain.  Pt states he was helping someone from a car to a wheelchair and heard a "pop" to his lateral L knee, is now c/o pain to lateral aspect from L knee to L ankle.  Denies taking any OTC meds PTA.  Pt answering questions appropriately, speaking in complete sentences, respirations even and unlabored.  Aao x 4.    "

## 2024-02-19 NOTE — FIRST PROVIDER EVALUATION
" Emergency Department TeleTriage Encounter Note      CHIEF COMPLAINT    Chief Complaint   Patient presents with    Leg Pain     Pt states "I think my knee gave out while I was taking someone out of the wheelchair". Pt limping to triage.        VITAL SIGNS   Initial Vitals [02/18/24 1949]   BP Pulse Resp Temp SpO2   (!) 143/92 71 18 98.5 °F (36.9 °C) 97 %      MAP       --            ALLERGIES    Review of patient's allergies indicates:  No Known Allergies    PROVIDER TRIAGE NOTE  Verbal consent for the teletriage evaluation was given by the patient at the start of the evaluation.  All efforts will be made to maintain patient's privacy during the evaluation.      This is a teletriage evaluation of a 48 y.o. male presenting to the ED with c/o left knee injury while lifting sister out of her wheelchair. Limited physical exam via telehealth: The patient is awake, alert, answering questions appropriately and is not in respiratory distress.  As the Teletriage provider, I performed an initial assessment and ordered appropriate labs and imaging studies, if any, to facilitate the patient's care once placed in the ED. Once a room is available, care and a full evaluation will be completed by an alternate ED provider.  Any additional orders and the final disposition will be determined by that provider.  All imaging and labs will not be followed-up by the Teletriage Team, including myself.          ORDERS  Labs Reviewed - No data to display    ED Orders (720h ago, onward)      Start Ordered     Status Ordering Provider    02/18/24 1955 02/18/24 1954  X-Ray Knee 3 View Left  1 time imaging         Ordered SAM ALMANZA A              Virtual Visit Note: The provider triage portion of this emergency department evaluation and documentation was performed via lark, a HIPAA-compliant telemedicine application, in concert with a tele-presenter in the room. A face to face patient evaluation with one of my colleagues will occur once " the patient is placed in an emergency department room.      DISCLAIMER: This note was prepared with [employer] voice recognition transcription software. Garbled syntax, mangled pronouns, and other bizarre constructions may be attributed to that software system.

## 2024-02-19 NOTE — DISCHARGE INSTRUCTIONS
Please take Tylenol and ibuprofen as needed for your leg pain.  You can also apply lidocaine patches to your areas of pain.  Please rest, ice, compress, and elevate your legs when possible.  Follow-up with orthopedics.  Your referral has been placed.

## 2024-02-19 NOTE — ED PROVIDER NOTES
"Encounter Date: 2/18/2024       History     Chief Complaint   Patient presents with    Leg Pain     Pt states "I think my knee gave out while I was taking someone out of the wheelchair". Pt limping to triage.      Marcos Cohen is a 48 y.o. healthy male presenting to the emergency department for evaluation left leg pain extending from his left knee to his shin. States that he was helping with transporting someone from a car to a wheelchair when he heard "a pop" in the outer part of of his left knee. States that he began experiencing pain. He has been walking with a limp. Denies history of injury or surgery to the left leg. He denies paresthesias. He has not taken any medications for his symptoms prior to coming to the ED. He is not on blood thinners.     The history is provided by the patient.     Review of patient's allergies indicates:  No Known Allergies  No past medical history on file.  No past surgical history on file.  No family history on file.  Social History     Tobacco Use    Smoking status: Every Day     Current packs/day: 1.00     Average packs/day: 1 pack/day for 15.0 years (15.0 ttl pk-yrs)     Types: Cigarettes   Substance Use Topics    Alcohol use: Yes     Alcohol/week: 2.0 standard drinks of alcohol     Types: 2 Cans of beer per week    Drug use: Yes     Types: Marijuana     Review of Systems   Musculoskeletal:         Positive for left leg pain.    Neurological:  Negative for numbness.       Physical Exam     Initial Vitals [02/18/24 1949]   BP Pulse Resp Temp SpO2   (!) 143/92 71 18 98.5 °F (36.9 °C) 97 %      MAP       --         Physical Exam    Vitals reviewed.  Constitutional: He appears well-developed and well-nourished. No distress.   HENT:   Head: Normocephalic and atraumatic.   Eyes: Conjunctivae and EOM are normal.   Neck: Neck supple.   Normal range of motion.  Cardiovascular:  Normal rate, regular rhythm, normal heart sounds and intact distal pulses.           Pulses:       " Dorsalis pedis pulses are 2+ on the left side.        Posterior tibial pulses are 2+ on the left side.   Pulmonary/Chest: Breath sounds normal. No respiratory distress. He has no wheezes. He has no rhonchi. He has no rales. He exhibits no tenderness.   Musculoskeletal:         General: No tenderness or edema.      Cervical back: Normal range of motion and neck supple.      Comments: Unable to actively range his knee secondary to pain.  Able to passively range the knee.  No tenderness to palpation, crepitus, or deformity.  No edema or overlying skin changes.  No calf pain or swelling.  Able to range the ankle without difficulty.  No tenderness to palpation of the lateral or medial malleolus.  No tenderness to palpation over the Achilles tendon.     Neurological: He is alert and oriented to person, place, and time. He has normal strength. No cranial nerve deficit or sensory deficit.   Neurovascularly intact.   Skin: Skin is warm and dry. Capillary refill takes less than 2 seconds.   Psychiatric: He has a normal mood and affect. His behavior is normal. Judgment and thought content normal.         ED Course   Procedures  Labs Reviewed - No data to display       Imaging Results              X-Ray Tibia Fibula 2 View Left (Final result)  Result time 02/18/24 20:59:28      Final result by Nella Malone MD (02/18/24 20:59:28)                   Impression:      No acute bony abnormality detected.      Electronically signed by: Nella Malone  Date:    02/18/2024  Time:    20:59               Narrative:    EXAMINATION:  TWO VIEWS OF THE LEFT TIBIA AND FIBULA AND THREE VIEWS OF THE LEFT KNEE    CLINICAL HISTORY:  Pain in leg, unspecified; Injury, unspecified, initial encounter    TECHNIQUE:  AP and views of the left tibia and fibula.  AP, lateral and Merchant views of the left knee.    COMPARISON:  None.    FINDINGS:  Two views of the left tibia and fibula demonstrate no acute fracture or dislocation.    Three views of  the left knee demonstrate no acute fracture or dislocation.  There is a tiny patellar spur.  There is trace knee joint effusion.                                       X-Ray Knee 3 View Left (Final result)  Result time 02/18/24 20:59:28      Final result by Nella Malone MD (02/18/24 20:59:28)                   Impression:      No acute bony abnormality detected.      Electronically signed by: Nella Malone  Date:    02/18/2024  Time:    20:59               Narrative:    EXAMINATION:  TWO VIEWS OF THE LEFT TIBIA AND FIBULA AND THREE VIEWS OF THE LEFT KNEE    CLINICAL HISTORY:  Pain in leg, unspecified; Injury, unspecified, initial encounter    TECHNIQUE:  AP and views of the left tibia and fibula.  AP, lateral and Merchant views of the left knee.    COMPARISON:  None.    FINDINGS:  Two views of the left tibia and fibula demonstrate no acute fracture or dislocation.    Three views of the left knee demonstrate no acute fracture or dislocation.  There is a tiny patellar spur.  There is trace knee joint effusion.                                    X-Rays:   Independently Interpreted Readings:   Other Readings:  Left knee:  No acute fracture or dislocation    Left tib fib: No acute fracture or dislocation.    Medications   ketorolac injection 15 mg (15 mg Intramuscular Given 2/18/24 2050)     Medical Decision Making  Amount and/or Complexity of Data Reviewed  Radiology: ordered.    Risk  Prescription drug management.                          Medical Decision Making:   Initial Assessment:   Urgent evaluation of 48-year-old healthy male presenting with left leg pain extending from the knee to the ankle after transporting someone from a car to a wheelchair.  Patient states that he felt and heard something in his knee, which was followed by pain.  He has been able to bear weight but experiences pain when he does so.  He has been ambulatory with a limp.  No history of injury or surgery to the left leg.  On exam, he is  uncomfortable appearing but nontoxic.  Hemodynamically stable.  Afebrile in the ED. no tenderness to palpation or edema of the knee or ankle.  Unable to actively range the knee secondary to pain.  Able to passively range the knee.  Neurovascularly intact.  Plan for imaging.  We will give anti-inflammatories.  Clinical Tests:   Radiological Study: Ordered and Reviewed  ED Management:  On review of imaging, there is no acute fracture or dislocation of the left knee or tib fib.  There is trace effusion of the knee joint.  I updated the patient on all results.  Explained that he likely has a soft tissue injury.  Patient was placed in a knee immobilizer and provided crutches.  Discharge with prescriptions for anti-inflammatories and lidocaine patches.  Patient counseled on rice therapy.  Ambulatory referral to Orthopedics was provided.  Patient verbalized understanding and agreement with this plan of care. He was given specific return precautions. Advised to follow up with PCP as needed. All questions and concerns addressed. He is stable for discharge.     This note was created with MModal Fluency Direct Dictation. Please excuse any spelling or grammatical errors.             Clinical Impression:  Final diagnoses:  [T14.90XA] Injury  [M79.606] Leg pain  [M25.562] Acute pain of left knee (Primary)  [S89.92XA] Injury of left lower extremity, initial encounter          ED Disposition Condition    Discharge Stable          ED Prescriptions       Medication Sig Dispense Start Date End Date Auth. Provider    ibuprofen (ADVIL,MOTRIN) 600 MG tablet Take 1 tablet (600 mg total) by mouth every 6 (six) hours as needed for Pain. 20 tablet 2/18/2024 -- Jonathon Krause PA-C    LIDOcaine (LIDODERM) 5 % Place 1 patch onto the skin once daily. Remove & Discard patch within 12 hours or as directed by MD for 7 days 7 patch 2/18/2024 2/25/2024 Jonathon Krause PA-C          Follow-up Information    None          Jonathon Krause PA-C  02/19/24  [id number]